# Patient Record
Sex: FEMALE | URBAN - METROPOLITAN AREA
[De-identification: names, ages, dates, MRNs, and addresses within clinical notes are randomized per-mention and may not be internally consistent; named-entity substitution may affect disease eponyms.]

---

## 2021-09-24 ENCOUNTER — OUTPATIENT (OUTPATIENT)
Dept: OUTPATIENT SERVICES | Facility: HOSPITAL | Age: 26
LOS: 1 days | End: 2021-09-24
Payer: MEDICAID

## 2021-09-24 VITALS
HEIGHT: 61 IN | HEART RATE: 75 BPM | SYSTOLIC BLOOD PRESSURE: 120 MMHG | WEIGHT: 151.02 LBS | DIASTOLIC BLOOD PRESSURE: 70 MMHG | RESPIRATION RATE: 20 BRPM | TEMPERATURE: 97 F

## 2021-09-24 DIAGNOSIS — Z01.818 ENCOUNTER FOR OTHER PREPROCEDURAL EXAMINATION: ICD-10-CM

## 2021-09-24 DIAGNOSIS — Z13.89 ENCOUNTER FOR SCREENING FOR OTHER DISORDER: ICD-10-CM

## 2021-09-24 DIAGNOSIS — Z29.9 ENCOUNTER FOR PROPHYLACTIC MEASURES, UNSPECIFIED: ICD-10-CM

## 2021-09-24 DIAGNOSIS — N61.1 ABSCESS OF THE BREAST AND NIPPLE: ICD-10-CM

## 2021-09-24 DIAGNOSIS — N64.89 OTHER SPECIFIED DISORDERS OF BREAST: ICD-10-CM

## 2021-09-24 DIAGNOSIS — Z98.891 HISTORY OF UTERINE SCAR FROM PREVIOUS SURGERY: Chronic | ICD-10-CM

## 2021-09-24 LAB
ANION GAP SERPL CALC-SCNC: 13 MMOL/L — SIGNIFICANT CHANGE UP (ref 5–17)
APTT BLD: 26.8 SEC — LOW (ref 27.5–35.5)
BASOPHILS # BLD AUTO: 0.08 K/UL — SIGNIFICANT CHANGE UP (ref 0–0.2)
BASOPHILS NFR BLD AUTO: 0.6 % — SIGNIFICANT CHANGE UP (ref 0–2)
BLD GP AB SCN SERPL QL: SIGNIFICANT CHANGE UP
BUN SERPL-MCNC: 11.7 MG/DL — SIGNIFICANT CHANGE UP (ref 8–20)
CALCIUM SERPL-MCNC: 9.1 MG/DL — SIGNIFICANT CHANGE UP (ref 8.6–10.2)
CHLORIDE SERPL-SCNC: 104 MMOL/L — SIGNIFICANT CHANGE UP (ref 98–107)
CO2 SERPL-SCNC: 23 MMOL/L — SIGNIFICANT CHANGE UP (ref 22–29)
CREAT SERPL-MCNC: 0.71 MG/DL — SIGNIFICANT CHANGE UP (ref 0.5–1.3)
EOSINOPHIL # BLD AUTO: 0.29 K/UL — SIGNIFICANT CHANGE UP (ref 0–0.5)
EOSINOPHIL NFR BLD AUTO: 2.1 % — SIGNIFICANT CHANGE UP (ref 0–6)
GLUCOSE SERPL-MCNC: 90 MG/DL — SIGNIFICANT CHANGE UP (ref 70–99)
HCG SERPL-ACNC: <4 MIU/ML — SIGNIFICANT CHANGE UP
HCT VFR BLD CALC: 39.5 % — SIGNIFICANT CHANGE UP (ref 34.5–45)
HGB BLD-MCNC: 12.3 G/DL — SIGNIFICANT CHANGE UP (ref 11.5–15.5)
IMM GRANULOCYTES NFR BLD AUTO: 0.5 % — SIGNIFICANT CHANGE UP (ref 0–1.5)
INR BLD: 1.12 RATIO — SIGNIFICANT CHANGE UP (ref 0.88–1.16)
LYMPHOCYTES # BLD AUTO: 19.9 % — SIGNIFICANT CHANGE UP (ref 13–44)
LYMPHOCYTES # BLD AUTO: 2.7 K/UL — SIGNIFICANT CHANGE UP (ref 1–3.3)
MCHC RBC-ENTMCNC: 28.4 PG — SIGNIFICANT CHANGE UP (ref 27–34)
MCHC RBC-ENTMCNC: 31.1 GM/DL — LOW (ref 32–36)
MCV RBC AUTO: 91.2 FL — SIGNIFICANT CHANGE UP (ref 80–100)
MONOCYTES # BLD AUTO: 1.22 K/UL — HIGH (ref 0–0.9)
MONOCYTES NFR BLD AUTO: 9 % — SIGNIFICANT CHANGE UP (ref 2–14)
NEUTROPHILS # BLD AUTO: 9.18 K/UL — HIGH (ref 1.8–7.4)
NEUTROPHILS NFR BLD AUTO: 67.9 % — SIGNIFICANT CHANGE UP (ref 43–77)
PLATELET # BLD AUTO: 465 K/UL — HIGH (ref 150–400)
POTASSIUM SERPL-MCNC: 4.4 MMOL/L — SIGNIFICANT CHANGE UP (ref 3.5–5.3)
POTASSIUM SERPL-SCNC: 4.4 MMOL/L — SIGNIFICANT CHANGE UP (ref 3.5–5.3)
PROTHROM AB SERPL-ACNC: 12.9 SEC — SIGNIFICANT CHANGE UP (ref 10.6–13.6)
RBC # BLD: 4.33 M/UL — SIGNIFICANT CHANGE UP (ref 3.8–5.2)
RBC # FLD: 13.2 % — SIGNIFICANT CHANGE UP (ref 10.3–14.5)
SARS-COV-2 RNA SPEC QL NAA+PROBE: SIGNIFICANT CHANGE UP
SODIUM SERPL-SCNC: 140 MMOL/L — SIGNIFICANT CHANGE UP (ref 135–145)
WBC # BLD: 13.54 K/UL — HIGH (ref 3.8–10.5)
WBC # FLD AUTO: 13.54 K/UL — HIGH (ref 3.8–10.5)

## 2021-09-24 PROCEDURE — G0463: CPT

## 2021-09-24 RX ORDER — SODIUM CHLORIDE 9 MG/ML
3 INJECTION INTRAMUSCULAR; INTRAVENOUS; SUBCUTANEOUS ONCE
Refills: 0 | Status: DISCONTINUED | OUTPATIENT
Start: 2021-09-27 | End: 2021-09-27

## 2021-09-24 RX ORDER — CEFAZOLIN SODIUM 1 G
2000 VIAL (EA) INJECTION ONCE
Refills: 0 | Status: COMPLETED | OUTPATIENT
Start: 2021-09-27 | End: 2021-09-27

## 2021-09-24 NOTE — H&P PST ADULT - LAB RESULTS AND INTERPRETATION
wbc 13.54  neutrophils  9.18  results faxed to [facility] pt having surgery today spoke with Madelin. K DAmico NP 9/27/21 8 am

## 2021-09-24 NOTE — H&P PST ADULT - NSICDXFAMILYHX_GEN_ALL_CORE_FT
FAMILY HISTORY:  Grandparent  Still living? Yes, Estimated age: Age Unknown  FH: heart disease, Age at diagnosis: Age Unknown

## 2021-09-24 NOTE — H&P PST ADULT - HISTORY OF PRESENT ILLNESS
Patient reports that she has a growth that started as small lump about 2 months ago and has increased in size. Reports that abscess was drained on Friday 9/17/2021. C/o pain to left breast and redness and edema. Reports taking Ibuprofen, tylenol, and antibiotics with no effect. Denies any fever, chills, chest pain, SOB. Scheduled for  25 y.o female presents to PST today, Patient reports that she has a growth that started as a small lump about 2 months ago and has increased in size. Reports that abscess was drained on Friday 9/17/2021. C/o pain to left breast and redness/edema. Reports taking Ibuprofen, tylenol, and antibiotics with no effect. Denies any fever, chills, chest pain, SOB. Scheduled for excision of left breast abscess, incision and drainage with Dr. Finkelstein Marc 9/27/2021 25 y.o female presents to [facility] today. Patient reports she has a left breast growth that started as a small lump about 2 months ago and has increased in size. Reports that abscess was drained on Friday 9/17/2021. C/o pain to left breast and redness/edema. Reports taking Ibuprofen, tylenol, and antibiotics with no effect. Denies any fever, chills, chest pain, SOB. Scheduled for excision of left breast abscess, incision and drainage with Dr. Finkelstein Marc 9/27/2021

## 2021-09-24 NOTE — H&P PST ADULT - ASSESSMENT
24 y/o female scheduled for excision of left breast abscess, incision and drainage with Dr. Finkelstein Marc 2021    OPIOID RISK TOOL    NICOLA EACH BOX THAT APPLIES AND ADD TOTALS AT THE END    FAMILY HISTORY OF SUBSTANCE ABUSE                 FEMALE         MALE                                                Alcohol                             [  ]1 pt          [  ]3pts                                               Illegal Durgs                     [  ]2 pts        [  ]3pts                                               Rx Drugs                           [  ]4 pts        [  ]4 pts    PERSONAL HISTORY OF SUBSTANCE ABUSE                                                                                          Alcohol                             [  ]3 pts       [  ]3 pts                                               Illegal Drugs                     [  ]4 pts        [  ]4 pts                                               Rx Drugs                           [  ]5 pts        [  ]5 pts    AGE BETWEEN 16-45 YEARS                                      [ x ]1 pt         [  ]1 pt    HISTORY OF PREADOLESCENT   SEXUAL ABUSE                                                             [  ]3 pts        [  ]0pts    PSYCHOLOGICAL DISEASE                     ADD, OCD, Bipolar, Schizophrenia        [  ]2 pts         [  ]2 pts                      Depression                                               [  ]1 pt           [  ]1 pt           SCORING TOTAL   (add numbers and type here)              (1)                                     A score of 3 or lower indicated LOW risk for future opioid abuse  A score of 4 to 7 indicated moderate risk for future opioid abuse  A score of 8 or higher indicates a high risk for opioid abuse    CAPRINI SCORE [CLOT]    AGE RELATED RISK FACTORS                                                       MOBILITY RELATED FACTORS  [ ] Age 41-60 years                                            (1 Point)                  [ ] Bed rest                                                        (1 Point)  [ ] Age: 61-74 years                                           (2 Points)                 [ ] Plaster cast                                                   (2 Points)  [ ] Age= 75 years                                              (3 Points)                 [ ] Bed bound for more than 72 hours                 (2 Points)    DISEASE RELATED RISK FACTORS                                               GENDER SPECIFIC FACTORS  [ ] Edema in the lower extremities                       (1 Point)                  [ ] Pregnancy                                                     (1 Point)  [ ] Varicose veins                                               (1 Point)                  [ ] Post-partum < 6 weeks                                   (1 Point)             [x ] BMI > 25 Kg/m2                                            (1 Point)                  [ ] Hormonal therapy  or oral contraception          (1 Point)                 [ ] Sepsis (in the previous month)                        (1 Point)                  [ ] History of pregnancy complications                 (1 point)  [ ] Pneumonia or serious lung disease                                               [ ] Unexplained or recurrent                     (1 Point)           (in the previous month)                               (1 Point)  [ ] Abnormal pulmonary function test                     (1 Point)                 SURGERY RELATED RISK FACTORS  [ ] Acute myocardial infarction                              (1 Point)                 [ ]  Section                                             (1 Point)  [ ] Congestive heart failure (in the previous month)  (1 Point)               [ ] Minor surgery                                                  (1 Point)   [ ] Inflammatory bowel disease                             (1 Point)                 [ ] Arthroscopic surgery                                        (2 Points)  [ ] Central venous access                                      (2 Points)                [x ] General surgery lasting more than 45 minutes   (2 Points)       [ ] Stroke (in the previous month)                          (5 Points)               [ ] Elective arthroplasty                                         (5 Points)                                                                                                                                               HEMATOLOGY RELATED FACTORS                                                 TRAUMA RELATED RISK FACTORS  [ ] Prior episodes of VTE                                     (3 Points)                [ ] Fracture of the hip, pelvis, or leg                       (5 Points)  [ ] Positive family history for VTE                         (3 Points)                 [ ] Acute spinal cord injury (in the previous month)  (5 Points)  [ ] Prothrombin 18075 A                                     (3 Points)                 [ ] Paralysis  (less than 1 month)                             (5 Points)  [ ] Factor V Leiden                                             (3 Points)                  [ ] Multiple Trauma within 1 month                        (5 Points)  [ ] Lupus anticoagulants                                     (3 Points)                                                           [ ] Anticardiolipin antibodies                               (3 Points)                                                       [ ] High homocysteine in the blood                      (3 Points)                                             [ ] Other congenital or acquired thrombophilia      (3 Points)                                                [ ] Heparin induced thrombocytopenia                  (3 Points)                                          Total Score [     3     ]    Caprini Score 0 - 2:  Low Risk, No VTE Prophylaxis required for most patients, encourage ambulation  Caprini Score 3 - 6:  At Risk, pharmacologic VTE prophylaxis is indicated for most patients (in the absence of a contraindication)  Caprini Score Greater than or = 7:  High Risk, pharmacologic VTE prophylaxis is indicated for most patients (in the absence of a contraindication)

## 2021-09-24 NOTE — ASU PATIENT PROFILE, ADULT - BRADEN SCORE (IF 18 OR LESS ACTIVATE SKIN INJURY RISK INCREASED GUIDELINE), MLM
Writer called patient and patient could not make any dates/times before 1/25/21.    Patient scheduled for 1/25/21.   25

## 2021-09-24 NOTE — H&P PST ADULT - PROBLEM SELECTOR PLAN 4
Scheduled for excision of left breast abscess, incision and drainage with Dr. Finkelstein Mark on 9/27/2021

## 2021-09-24 NOTE — H&P PST ADULT - NSANTHOSAYNRD_GEN_A_CORE
Cardiac No. ASHANTI screening performed.  STOP BANG Legend: 0-2 = LOW Risk; 3-4 = INTERMEDIATE Risk; 5-8 = HIGH Risk

## 2021-09-26 ENCOUNTER — TRANSCRIPTION ENCOUNTER (OUTPATIENT)
Age: 26
End: 2021-09-26

## 2021-09-27 ENCOUNTER — INPATIENT (INPATIENT)
Facility: HOSPITAL | Age: 26
LOS: 1 days | Discharge: HOME | DRG: 585 | End: 2021-09-29
Attending: SPECIALIST | Admitting: SPECIALIST
Payer: MEDICAID

## 2021-09-27 ENCOUNTER — RESULT REVIEW (OUTPATIENT)
Age: 26
End: 2021-09-27

## 2021-09-27 VITALS
WEIGHT: 149.91 LBS | HEIGHT: 61 IN | OXYGEN SATURATION: 98 % | RESPIRATION RATE: 15 BRPM | TEMPERATURE: 98 F | HEART RATE: 70 BPM

## 2021-09-27 DIAGNOSIS — N64.89 OTHER SPECIFIED DISORDERS OF BREAST: ICD-10-CM

## 2021-09-27 DIAGNOSIS — Z98.891 HISTORY OF UTERINE SCAR FROM PREVIOUS SURGERY: Chronic | ICD-10-CM

## 2021-09-27 DIAGNOSIS — N61.1 ABSCESS OF THE BREAST AND NIPPLE: ICD-10-CM

## 2021-09-27 PROCEDURE — 88304 TISSUE EXAM BY PATHOLOGIST: CPT | Mod: 26

## 2021-09-27 RX ORDER — SENNA PLUS 8.6 MG/1
2 TABLET ORAL AT BEDTIME
Refills: 0 | Status: DISCONTINUED | OUTPATIENT
Start: 2021-09-27 | End: 2021-09-29

## 2021-09-27 RX ORDER — FENTANYL CITRATE 50 UG/ML
25 INJECTION INTRAVENOUS
Refills: 0 | Status: DISCONTINUED | OUTPATIENT
Start: 2021-09-27 | End: 2021-09-27

## 2021-09-27 RX ORDER — SODIUM CHLORIDE 9 MG/ML
1000 INJECTION, SOLUTION INTRAVENOUS
Refills: 0 | Status: DISCONTINUED | OUTPATIENT
Start: 2021-09-27 | End: 2021-09-27

## 2021-09-27 RX ORDER — ACETAMINOPHEN 500 MG
650 TABLET ORAL EVERY 6 HOURS
Refills: 0 | Status: DISCONTINUED | OUTPATIENT
Start: 2021-09-27 | End: 2021-09-29

## 2021-09-27 RX ORDER — OXYCODONE HYDROCHLORIDE 5 MG/1
5 TABLET ORAL EVERY 4 HOURS
Refills: 0 | Status: DISCONTINUED | OUTPATIENT
Start: 2021-09-27 | End: 2021-09-29

## 2021-09-27 RX ORDER — OXYCODONE HYDROCHLORIDE 5 MG/1
10 TABLET ORAL EVERY 4 HOURS
Refills: 0 | Status: DISCONTINUED | OUTPATIENT
Start: 2021-09-27 | End: 2021-09-29

## 2021-09-27 RX ORDER — ONDANSETRON 8 MG/1
4 TABLET, FILM COATED ORAL ONCE
Refills: 0 | Status: DISCONTINUED | OUTPATIENT
Start: 2021-09-27 | End: 2021-09-27

## 2021-09-27 RX ADMIN — SENNA PLUS 2 TABLET(S): 8.6 TABLET ORAL at 21:47

## 2021-09-27 RX ADMIN — Medication 100 MILLIGRAM(S): at 11:51

## 2021-09-27 RX ADMIN — OXYCODONE HYDROCHLORIDE 5 MILLIGRAM(S): 5 TABLET ORAL at 17:36

## 2021-09-27 RX ADMIN — OXYCODONE HYDROCHLORIDE 5 MILLIGRAM(S): 5 TABLET ORAL at 12:14

## 2021-09-27 RX ADMIN — FENTANYL CITRATE 25 MICROGRAM(S): 50 INJECTION INTRAVENOUS at 12:15

## 2021-09-27 RX ADMIN — FENTANYL CITRATE 25 MICROGRAM(S): 50 INJECTION INTRAVENOUS at 12:00

## 2021-09-27 RX ADMIN — OXYCODONE HYDROCHLORIDE 5 MILLIGRAM(S): 5 TABLET ORAL at 11:44

## 2021-09-27 RX ADMIN — OXYCODONE HYDROCHLORIDE 5 MILLIGRAM(S): 5 TABLET ORAL at 16:36

## 2021-09-27 RX ADMIN — Medication 100 MILLIGRAM(S): at 21:47

## 2021-09-27 RX ADMIN — FENTANYL CITRATE 25 MICROGRAM(S): 50 INJECTION INTRAVENOUS at 11:45

## 2021-09-27 NOTE — CHART NOTE - NSCHARTNOTEFT_GEN_A_CORE
POST-OPERATIVE NOTE    Subjective: patient POD 0 for left breast I&D doing well, pain controlled, tolerating fluids, will had dinner tonight, afebrile, voiding spontaneously.    Vital Signs Last 24 Hrs  T(C): 36.4 (27 Sep 2021 15:00), Max: 36.7 (27 Sep 2021 09:24)  T(F): 97.5 (27 Sep 2021 15:00), Max: 98 (27 Sep 2021 09:24)  HR: 69 (27 Sep 2021 15:00) (61 - 91)  BP: 101/60 (27 Sep 2021 15:00) (89/54 - 111/76)  BP(mean): 66 (27 Sep 2021 14:00) (62 - 84)  RR: 15 (27 Sep 2021 15:00) (12 - 20)  SpO2: 100% (27 Sep 2021 15:00) (98% - 100%)  I&O's Detail    27 Sep 2021 07:01  -  27 Sep 2021 17:18  --------------------------------------------------------  IN:    Lactated Ringers: 300 mL    Oral Fluid: 360 mL  Total IN: 660 mL    OUT:    Voided (mL): 250 mL  Total OUT: 250 mL    Total NET: 410 mL        ceFAZolin   IVPB 2000  clindamycin IVPB 600  clindamycin IVPB   ceFAZolin   IVPB 2000  clindamycin IVPB 600  clindamycin IVPB     PAST MEDICAL & SURGICAL HISTORY:  Asthma  As a child    H/O:   ,2019          Physical Exam:  General: NAD, resting comfortably in bed  Pulmonary: Nonlabored breathing, no respiratory distress  Cardiovascular: NSR  Breasth: left breast with packing in place, dressing not saturated  Abdominal: soft, NT/ND, no reboudn or guarding.  Extremities: WWP          Radiology and Additional Studies:    Assessment:  The patient is a 25y Female who is now several hours post-op from a I&D of left breast, doing well on IV ABx, doing well, will need VNS for dressing changes  Plan:  - Pain control as needed  - OOB and ambulating as tolerated  - f/u VNS

## 2021-09-27 NOTE — BRIEF OPERATIVE NOTE - OPERATION/FINDINGS
left breast abscess on exam with tense overlying skin, 9cm incision made inferior to the nipple areolar complex, approx 75cc of pus evacuated. Intraop cultures obtained. Packing placed into cavity.

## 2021-09-28 ENCOUNTER — TRANSCRIPTION ENCOUNTER (OUTPATIENT)
Age: 26
End: 2021-09-28

## 2021-09-28 LAB
ANION GAP SERPL CALC-SCNC: 13 MMOL/L — SIGNIFICANT CHANGE UP (ref 5–17)
BASOPHILS # BLD AUTO: 0.06 K/UL — SIGNIFICANT CHANGE UP (ref 0–0.2)
BASOPHILS NFR BLD AUTO: 0.5 % — SIGNIFICANT CHANGE UP (ref 0–2)
BUN SERPL-MCNC: 9.3 MG/DL — SIGNIFICANT CHANGE UP (ref 8–20)
CALCIUM SERPL-MCNC: 8.9 MG/DL — SIGNIFICANT CHANGE UP (ref 8.6–10.2)
CHLORIDE SERPL-SCNC: 102 MMOL/L — SIGNIFICANT CHANGE UP (ref 98–107)
CO2 SERPL-SCNC: 21 MMOL/L — LOW (ref 22–29)
COVID-19 SPIKE DOMAIN AB INTERP: POSITIVE
COVID-19 SPIKE DOMAIN ANTIBODY RESULT: >250 U/ML — HIGH
CREAT SERPL-MCNC: 0.57 MG/DL — SIGNIFICANT CHANGE UP (ref 0.5–1.3)
EOSINOPHIL # BLD AUTO: 0.17 K/UL — SIGNIFICANT CHANGE UP (ref 0–0.5)
EOSINOPHIL NFR BLD AUTO: 1.4 % — SIGNIFICANT CHANGE UP (ref 0–6)
GLUCOSE SERPL-MCNC: 93 MG/DL — SIGNIFICANT CHANGE UP (ref 70–99)
HCT VFR BLD CALC: 35 % — SIGNIFICANT CHANGE UP (ref 34.5–45)
HGB BLD-MCNC: 11.2 G/DL — LOW (ref 11.5–15.5)
IMM GRANULOCYTES NFR BLD AUTO: 0.5 % — SIGNIFICANT CHANGE UP (ref 0–1.5)
LYMPHOCYTES # BLD AUTO: 26 % — SIGNIFICANT CHANGE UP (ref 13–44)
LYMPHOCYTES # BLD AUTO: 3.19 K/UL — SIGNIFICANT CHANGE UP (ref 1–3.3)
MAGNESIUM SERPL-MCNC: 2.1 MG/DL — SIGNIFICANT CHANGE UP (ref 1.6–2.6)
MCHC RBC-ENTMCNC: 28.7 PG — SIGNIFICANT CHANGE UP (ref 27–34)
MCHC RBC-ENTMCNC: 32 GM/DL — SIGNIFICANT CHANGE UP (ref 32–36)
MCV RBC AUTO: 89.7 FL — SIGNIFICANT CHANGE UP (ref 80–100)
MONOCYTES # BLD AUTO: 0.98 K/UL — HIGH (ref 0–0.9)
MONOCYTES NFR BLD AUTO: 8 % — SIGNIFICANT CHANGE UP (ref 2–14)
NEUTROPHILS # BLD AUTO: 7.81 K/UL — HIGH (ref 1.8–7.4)
NEUTROPHILS NFR BLD AUTO: 63.6 % — SIGNIFICANT CHANGE UP (ref 43–77)
PHOSPHATE SERPL-MCNC: 3.8 MG/DL — SIGNIFICANT CHANGE UP (ref 2.4–4.7)
PLATELET # BLD AUTO: 369 K/UL — SIGNIFICANT CHANGE UP (ref 150–400)
POTASSIUM SERPL-MCNC: 3.8 MMOL/L — SIGNIFICANT CHANGE UP (ref 3.5–5.3)
POTASSIUM SERPL-SCNC: 3.8 MMOL/L — SIGNIFICANT CHANGE UP (ref 3.5–5.3)
RBC # BLD: 3.9 M/UL — SIGNIFICANT CHANGE UP (ref 3.8–5.2)
RBC # FLD: 13.2 % — SIGNIFICANT CHANGE UP (ref 10.3–14.5)
SARS-COV-2 IGG+IGM SERPL QL IA: >250 U/ML — HIGH
SARS-COV-2 IGG+IGM SERPL QL IA: POSITIVE
SODIUM SERPL-SCNC: 136 MMOL/L — SIGNIFICANT CHANGE UP (ref 135–145)
WBC # BLD: 12.27 K/UL — HIGH (ref 3.8–10.5)
WBC # FLD AUTO: 12.27 K/UL — HIGH (ref 3.8–10.5)

## 2021-09-28 RX ORDER — SENNA PLUS 8.6 MG/1
2 TABLET ORAL
Qty: 0 | Refills: 0 | DISCHARGE
Start: 2021-09-28

## 2021-09-28 RX ORDER — OXYCODONE HYDROCHLORIDE 5 MG/1
1 TABLET ORAL
Qty: 16 | Refills: 0
Start: 2021-09-28 | End: 2021-10-01

## 2021-09-28 RX ADMIN — OXYCODONE HYDROCHLORIDE 5 MILLIGRAM(S): 5 TABLET ORAL at 07:10

## 2021-09-28 RX ADMIN — Medication 650 MILLIGRAM(S): at 21:37

## 2021-09-28 RX ADMIN — SENNA PLUS 2 TABLET(S): 8.6 TABLET ORAL at 22:03

## 2021-09-28 RX ADMIN — Medication 100 MILLIGRAM(S): at 14:04

## 2021-09-28 RX ADMIN — Medication 100 MILLIGRAM(S): at 22:05

## 2021-09-28 RX ADMIN — Medication 100 MILLIGRAM(S): at 06:15

## 2021-09-28 RX ADMIN — Medication 650 MILLIGRAM(S): at 20:37

## 2021-09-28 RX ADMIN — OXYCODONE HYDROCHLORIDE 5 MILLIGRAM(S): 5 TABLET ORAL at 06:29

## 2021-09-28 NOTE — PROGRESS NOTE ADULT - ASSESSMENT
24 yo F who presented with abscess of L breast ,POD 1 s/p I&D. Clinically well.     Plan:   - Pain control MMD   - Reg diet   - Vitals q4h   - F/U AM labs, elevated WBC   - Continue IV Zosyn   - Packing change at bedside this AM   - Lov ppx

## 2021-09-28 NOTE — DISCHARGE NOTE PROVIDER - NSDCCPCAREPLAN_GEN_ALL_CORE_FT
PRINCIPAL DISCHARGE DIAGNOSIS  Diagnosis: Breast abscess  Assessment and Plan of Treatment: WOUND CARE:   BATHING: Please do not submerge wound underwater. You may shower and/or sponge bathe.   ACTIVITY: No heavy lifting or straining. Otherwise, you may return to your usual level of physical activity. If you are taking narcotic pain medication (such as oxycodone) DO NOT drive a car, drink alcohol, operate machinery or make important decisions.  DIET: Return to your usual diet.  NOTIFY YOUR SURGEON IF: You have any bleeding that does not stop, chest pain, shortness of breath, any pus draining from your wound(s), any fever (over 100.4 F) or chills, persistent nausea/vomiting, persistent diarrhea, or if your pain is not controlled on your discharge pain medications.  FOLLOW-UP: Please follow up with Dr. Finkelstein in 10-14 days.,          PRINCIPAL DISCHARGE DIAGNOSIS  Diagnosis: Breast abscess  Assessment and Plan of Treatment: WOUND CARE: dry packing with 4 x 4 gauze dressing changes daily.   BATHING: Please do not submerge wound underwater. You may shower and/or sponge bathe.   ACTIVITY: No heavy lifting or straining. Otherwise, you may return to your usual level of physical activity. If you are taking narcotic pain medication (such as oxycodone) DO NOT drive a car, drink alcohol, operate machinery or make important decisions.  DIET: Return to your usual diet.  NOTIFY YOUR SURGEON IF: You have any bleeding that does not stop, chest pain, shortness of breath, any pus draining from your wound(s), any fever (over 100.4 F) or chills, persistent nausea/vomiting, persistent diarrhea, or if your pain is not controlled on your discharge pain medications.  FOLLOW-UP: Please follow up with Dr. Finkelstein in 10-14 days.,   You have been prescribed clindamycin for 7 days. You may take tylenol or ibuprofen for pain

## 2021-09-28 NOTE — DISCHARGE NOTE PROVIDER - NSDCMRMEDTOKEN_GEN_ALL_CORE_FT
ibuprofen:   Tylenol:    clindamycin 300 mg oral capsule: 1 cap(s) orally 2 times a day   ibuprofen:   oxyCODONE 5 mg oral tablet: 1 tab(s) orally every 6 hours, As Needed -Moderate Pain (4 - 6) - for severe pain MDD:4 tabs  senna oral tablet: 2 tab(s) orally once a day (at bedtime)  Tylenol:

## 2021-09-28 NOTE — DISCHARGE NOTE PROVIDER - HOSPITAL COURSE
Patient presented to [facility] for elective procedure, I&D of left breast for breast abscess. The case went well, remained HD stable, was extubated and transferred from pacu to floor. She received IV abx while in hospital. VNS was set up so she can receive packing changes as well. She will be discharged on PO abx. Upon discharge, pain well controlled, hemodynamically stable, leukocytosis downtrending, pain well controlled, ambulating/voiding independent Patient presented to [facility] for elective procedure, I&D of left breast for breast abscess. The case went well, remained HD stable, was extubated and transferred from pacu to floor. She received IV abx while in hospital. VNS was set up so she can receive packing changes as well. She will be discharged on PO abx. Upon discharge, pain well controlled, hemodynamically stable, leukocytosis downtrending, pain well controlled, ambulating/voiding independently.   Appropriate meds sent to pharmacy.   Follow-up with Dr. Finkelstein in 10-14 days.

## 2021-09-28 NOTE — DISCHARGE NOTE PROVIDER - CARE PROVIDER_API CALL
Finkelstein, Marc E (MD)  Surgery  [address], [address]  [place] [address]  Phone: [phone]  Fax: [phone]  Follow Up Time:

## 2021-09-29 ENCOUNTER — TRANSCRIPTION ENCOUNTER (OUTPATIENT)
Age: 26
End: 2021-09-29

## 2021-09-29 VITALS
OXYGEN SATURATION: 100 % | SYSTOLIC BLOOD PRESSURE: 99 MMHG | TEMPERATURE: 98 F | RESPIRATION RATE: 18 BRPM | HEART RATE: 87 BPM | DIASTOLIC BLOOD PRESSURE: 68 MMHG

## 2021-09-29 PROCEDURE — 36415 COLL VENOUS BLD VENIPUNCTURE: CPT

## 2021-09-29 PROCEDURE — 87070 CULTURE OTHR SPECIMN AEROBIC: CPT

## 2021-09-29 PROCEDURE — 86769 SARS-COV-2 COVID-19 ANTIBODY: CPT

## 2021-09-29 PROCEDURE — 83735 ASSAY OF MAGNESIUM: CPT

## 2021-09-29 PROCEDURE — 88304 TISSUE EXAM BY PATHOLOGIST: CPT

## 2021-09-29 PROCEDURE — 80048 BASIC METABOLIC PNL TOTAL CA: CPT

## 2021-09-29 PROCEDURE — 84100 ASSAY OF PHOSPHORUS: CPT

## 2021-09-29 PROCEDURE — 85025 COMPLETE CBC W/AUTO DIFF WBC: CPT

## 2021-09-29 RX ADMIN — Medication 100 MILLIGRAM(S): at 05:33

## 2021-09-29 RX ADMIN — Medication 650 MILLIGRAM(S): at 06:32

## 2021-09-29 RX ADMIN — Medication 100 MILLIGRAM(S): at 14:06

## 2021-09-29 RX ADMIN — Medication 650 MILLIGRAM(S): at 05:32

## 2021-09-29 NOTE — PROGRESS NOTE ADULT - ASSESSMENT
24 yo F who presented with abscess of L breast ,POD 2 s/p I&D. Clinically well.  Patient will be discharged home with visiting nursing services for aid in breast packing.     Plan:   - Pain control MMD   - Reg diet   - Vitals q4h   - Continue IV Clindamycin, will continue PO after dc  - Packing change at bedside this AM   - Lov ppx   - F/u Case Management on VNS status

## 2021-09-29 NOTE — DISCHARGE NOTE NURSING/CASE MANAGEMENT/SOCIAL WORK - PATIENT PORTAL LINK FT
You can access the FollowMyHealth Patient Portal offered by [facility] by registering at the following website: [email or web address]. By joining [email or web address]’s FollowMyHealth portal, you will also be able to view your health information using other applications (apps) compatible with our system.

## 2021-09-29 NOTE — PROGRESS NOTE ADULT - SUBJECTIVE AND OBJECTIVE BOX
INTERVAL HPI/OVERNIGHT EVENTS:    Patient is POD2 s/p I&D of left breast abscess.  She is doing well post-operatively and pending confirmation from Case Management for VNS for discharge home.  Dressing was changed 9/28 AM and patient expressed pain during repacking.  Overnight, no acute events.  VSS.     MEDICATIONS  (STANDING):  clindamycin IVPB 600 milliGRAM(s) IV Intermittent every 8 hours  clindamycin IVPB      senna 2 Tablet(s) Oral at bedtime    MEDICATIONS  (PRN):  acetaminophen   Tablet .. 650 milliGRAM(s) Oral every 6 hours PRN Temp greater or equal to 38C (100.4F), Mild Pain (1 - 3)  oxyCODONE    IR 5 milliGRAM(s) Oral every 4 hours PRN Moderate Pain (4 - 6)  oxyCODONE    IR 10 milliGRAM(s) Oral every 4 hours PRN Severe Pain (7 - 10)      Vital Signs Last 24 Hrs  T(C): 37.4 (29 Sep 2021 00:11), Max: 37.5 (28 Sep 2021 07:29)  T(F): 99.3 (29 Sep 2021 00:11), Max: 99.5 (28 Sep 2021 07:29)  HR: 89 (29 Sep 2021 00:11) (82 - 89)  BP: 96/61 (29 Sep 2021 00:11) (89/59 - 100/67)  BP(mean): --  RR: 18 (29 Sep 2021 00:11) (16 - 18)  SpO2: 98% (29 Sep 2021 00:11) (97% - 100%)    Physical Exam:    Neurological:  No sensory/motor deficits    Respiratory: No accessory muscle use    Chest: dressing to L breast, c/d/i    Gastrointestinal: Soft, non-tender, normal bowel sounds    Extremities: No peripheral edema, No cyanosis, clubbing       I&O's Detail    27 Sep 2021 07:01  -  28 Sep 2021 07:00  --------------------------------------------------------  IN:    Lactated Ringers: 300 mL    Oral Fluid: 360 mL  Total IN: 660 mL    OUT:    Voided (mL): 250 mL  Total OUT: 250 mL    Total NET: 410 mL      28 Sep 2021 07:01  -  29 Sep 2021 03:05  --------------------------------------------------------  IN:    IV PiggyBack: 50 mL  Total IN: 50 mL    OUT:  Total OUT: 0 mL    Total NET: 50 mL          LABS:                        11.2   12.27 )-----------( 369      ( 28 Sep 2021 07:30 )             35.0     09-28    136  |  102  |  9.3  ----------------------------<  93  3.8   |  21.0<L>  |  0.57    Ca    8.9      28 Sep 2021 07:30  Phos  3.8     09-28  Mg     2.1     09-28            RADIOLOGY & ADDITIONAL STUDIES:
INTERVAL HPI/OVERNIGHT EVENTS:    STATUS POST:  I&D of breast abscess    POST OPERATIVE DAY #: 1    NAEON. Pt remained afebrile. Tolerating diet. Pain controlled on current regimen. Denies fever/chills. N&V.     MEDICATIONS  (STANDING):  clindamycin IVPB 600 milliGRAM(s) IV Intermittent every 8 hours  clindamycin IVPB      senna 2 Tablet(s) Oral at bedtime    MEDICATIONS  (PRN):  acetaminophen   Tablet .. 650 milliGRAM(s) Oral every 6 hours PRN Temp greater or equal to 38C (100.4F), Mild Pain (1 - 3)  oxyCODONE    IR 5 milliGRAM(s) Oral every 4 hours PRN Moderate Pain (4 - 6)  oxyCODONE    IR 10 milliGRAM(s) Oral every 4 hours PRN Severe Pain (7 - 10)      Vital Signs Last 24 Hrs  T(C): 37.2 (28 Sep 2021 04:30), Max: 37.3 (27 Sep 2021 20:15)  T(F): 99 (28 Sep 2021 04:30), Max: 99.1 (27 Sep 2021 20:15)  HR: 88 (28 Sep 2021 04:30) (61 - 91)  BP: 93/60 (28 Sep 2021 04:30) (89/54 - 111/76)  BP(mean): 66 (27 Sep 2021 14:00) (62 - 84)  RR: 18 (28 Sep 2021 04:30) (12 - 20)  SpO2: 97% (28 Sep 2021 04:30) (97% - 100%)    Physical Exam:  Gen: AAOx3, NAD   Neurological:  No sensory/motor deficits  Respiratory: Breath Sounds equal & clear to auscultation, no accessory muscle use, on RA  Breast: Left breast packing in place, no strikethrough , appropriately tender to palpation   Cardiovascular: Regular rate & rhythm, HDS   Gastrointestinal: Soft, non-tender, non-distended   Extremities: No peripheral edema, No cyanosis, clubbing   Vascular: Equal and normal pulses: 2+ peripheral pulses throughout  Musculoskeletal: No joint pain, swelling or deformity; no limitation of movement        I&O's Detail    27 Sep 2021 07:01  -  28 Sep 2021 06:46  --------------------------------------------------------  IN:    Lactated Ringers: 300 mL    Oral Fluid: 360 mL  Total IN: 660 mL    OUT:    Voided (mL): 250 mL  Total OUT: 250 mL    Total NET: 410 mL          LABS:                RADIOLOGY & ADDITIONAL STUDIES:

## 2021-09-30 LAB
CULTURE RESULTS: SIGNIFICANT CHANGE UP
CULTURE RESULTS: SIGNIFICANT CHANGE UP
SPECIMEN SOURCE: SIGNIFICANT CHANGE UP
SPECIMEN SOURCE: SIGNIFICANT CHANGE UP

## 2021-10-13 LAB — SURGICAL PATHOLOGY STUDY: SIGNIFICANT CHANGE UP

## 2021-11-22 RX ORDER — ACETAMINOPHEN 500 MG
0 TABLET ORAL
Qty: 0 | Refills: 0 | DISCHARGE

## 2021-11-22 RX ORDER — IBUPROFEN 200 MG
0 TABLET ORAL
Qty: 0 | Refills: 0 | DISCHARGE